# Patient Record
Sex: FEMALE | Race: WHITE
[De-identification: names, ages, dates, MRNs, and addresses within clinical notes are randomized per-mention and may not be internally consistent; named-entity substitution may affect disease eponyms.]

---

## 2021-09-05 ENCOUNTER — TRANSCRIPTION ENCOUNTER (OUTPATIENT)
Age: 36
End: 2021-09-05

## 2021-10-13 PROBLEM — Z00.00 ENCOUNTER FOR PREVENTIVE HEALTH EXAMINATION: Status: ACTIVE | Noted: 2021-10-13

## 2021-10-25 ENCOUNTER — NON-APPOINTMENT (OUTPATIENT)
Age: 36
End: 2021-10-25

## 2021-10-25 ENCOUNTER — APPOINTMENT (OUTPATIENT)
Dept: BREAST CENTER | Facility: CLINIC | Age: 36
End: 2021-10-25
Payer: COMMERCIAL

## 2021-10-25 VITALS
HEIGHT: 69 IN | SYSTOLIC BLOOD PRESSURE: 123 MMHG | BODY MASS INDEX: 31.84 KG/M2 | DIASTOLIC BLOOD PRESSURE: 87 MMHG | OXYGEN SATURATION: 98 % | WEIGHT: 215 LBS | HEART RATE: 85 BPM

## 2021-10-25 DIAGNOSIS — G43.D0 ABDOMINAL MIGRAINE, NOT INTRACTABLE: ICD-10-CM

## 2021-10-25 DIAGNOSIS — Z87.891 PERSONAL HISTORY OF NICOTINE DEPENDENCE: ICD-10-CM

## 2021-10-25 DIAGNOSIS — Z80.3 FAMILY HISTORY OF MALIGNANT NEOPLASM OF BREAST: ICD-10-CM

## 2021-10-25 DIAGNOSIS — R92.8 OTHER ABNORMAL AND INCONCLUSIVE FINDINGS ON DIAGNOSTIC IMAGING OF BREAST: ICD-10-CM

## 2021-10-25 DIAGNOSIS — Z80.8 FAMILY HISTORY OF MALIGNANT NEOPLASM OF OTHER ORGANS OR SYSTEMS: ICD-10-CM

## 2021-10-25 DIAGNOSIS — Z78.9 OTHER SPECIFIED HEALTH STATUS: ICD-10-CM

## 2021-10-25 PROCEDURE — 99204 OFFICE O/P NEW MOD 45 MIN: CPT

## 2021-10-25 RX ORDER — PROCHLORPERAZINE MALEATE 5 MG/1
TABLET ORAL
Refills: 0 | Status: ACTIVE | COMMUNITY

## 2021-10-25 NOTE — ASSESSMENT
[FreeTextEntry1] : The patient is a 36-year-old nulliparous premenopausal white female with [place] Ashkenazi Bahai descent.  She underwent menarche at age 12.  She is currently undergoing fertility treatment for IUI.  She has a strong family history with her mother, Mrs. Winston, who is a patient of mine who had breast cancer at age 77 and underwent a partial mastectomy as well as her sister with breast cancer at age 52.  The patient was sent for early screening mammography and ultrasound by her gynecologist due to her strong family history which was performed on April 30, 2021 showing a 4 x 2 mm density in the right breast central region and she underwent compression mammography and ultrasound on May 6, 2021 showing a 1 x 0.3 x 0.6 cm cystic density in the right breast 6:00 region 4 cm from the nipple.  Follow-up diagnostic right breast mammography ultrasound was recommended in 6 months.  On exam today I cannot feel any suspicious densities in either breast.  The patient is going [place] [facility] [place] them.  Otherwise, I agree with recommendation for her to get a diagnostic right breast mammography and ultrasound in November.  If she is pregnant, she will have to just have the ultrasound.  If those are unchanged and negative, I would like to see her again in 6 months around April 2022.  Her next bilateral mammography and ultrasound should be due at that time if she is not pregnant.

## 2021-10-25 NOTE — REASON FOR VISIT
[Initial Evaluation] : an initial evaluation [FreeTextEntry1] : The patient comes in with a strong family history of breast cancer and fibrocystic mastopathy.

## 2021-10-25 NOTE — HISTORY OF PRESENT ILLNESS
[FreeTextEntry1] : The patient is a 36-year-old nulliparous premenopausal white female with [place] citizen Ashkenazi Tenriism descent.  She underwent menarche at age 12.  She is currently undergoing fertility treatment for IUI.  She has a strong family history with her mother, Mrs. Winston, who is a patient of mine who had breast cancer at age 77 and underwent a partial mastectomy as well as her sister with breast cancer at age 52.  The patient was sent for early screening mammography and ultrasound by her gynecologist due to her strong family history which was performed on April 30, 2021 showing a 4 x 2 mm density in the right breast central region and she underwent compression mammography and ultrasound on May 6, 2021 showing a 1 x 0.3 x 0.6 cm cystic density in the right breast 6:00 region 4 cm from the nipple.  Follow-up diagnostic right breast mammography ultrasound was recommended in 6 months and she comes in now for a surgical evaluation.

## 2021-10-25 NOTE — PAST MEDICAL HISTORY
[Menarche Age ____] : age at menarche was [unfilled] [Definite ___ (Date)] : the last menstrual period was [unfilled] [Total Preg ___] : G[unfilled] [Live Births ___] : P[unfilled]  [History of Hormone Replacement Treatment] : has no history of hormone replacement treatment

## 2021-10-27 ENCOUNTER — TRANSCRIPTION ENCOUNTER (OUTPATIENT)
Age: 36
End: 2021-10-27

## 2022-05-10 DIAGNOSIS — N63.10 UNSPECIFIED LUMP IN THE RIGHT BREAST, UNSPECIFIED QUADRANT: ICD-10-CM

## 2022-06-02 DIAGNOSIS — Z12.31 ENCOUNTER FOR SCREENING MAMMOGRAM FOR MALIGNANT NEOPLASM OF BREAST: ICD-10-CM

## 2023-05-10 ENCOUNTER — NON-APPOINTMENT (OUTPATIENT)
Age: 38
End: 2023-05-10

## 2023-08-22 ENCOUNTER — APPOINTMENT (OUTPATIENT)
Dept: BREAST CENTER | Facility: CLINIC | Age: 38
End: 2023-08-22
Payer: COMMERCIAL

## 2023-08-22 VITALS
HEIGHT: 69 IN | BODY MASS INDEX: 33.33 KG/M2 | WEIGHT: 225 LBS | SYSTOLIC BLOOD PRESSURE: 122 MMHG | OXYGEN SATURATION: 98 % | HEART RATE: 82 BPM | DIASTOLIC BLOOD PRESSURE: 76 MMHG

## 2023-08-22 DIAGNOSIS — N60.11 DIFFUSE CYSTIC MASTOPATHY OF RIGHT BREAST: ICD-10-CM

## 2023-08-22 DIAGNOSIS — R92.2 INCONCLUSIVE MAMMOGRAM: ICD-10-CM

## 2023-08-22 DIAGNOSIS — N60.12 DIFFUSE CYSTIC MASTOPATHY OF LEFT BREAST: ICD-10-CM

## 2023-08-22 DIAGNOSIS — Z80.3 FAMILY HISTORY OF MALIGNANT NEOPLASM OF BREAST: ICD-10-CM

## 2023-08-22 PROCEDURE — 99213 OFFICE O/P EST LOW 20 MIN: CPT

## 2023-08-22 NOTE — PAST MEDICAL HISTORY
[Menarche Age ____] : age at menarche was [unfilled] [Definite ___ (Date)] : the last menstrual period was [unfilled] [History of Hormone Replacement Treatment] : has no history of hormone replacement treatment [Total Preg ___] : G[unfilled] [Live Births ___] : P[unfilled]  [Age At Live Birth ___] : Age at live birth: [unfilled]

## 2023-08-22 NOTE — REASON FOR VISIT
[Follow-Up: _____] : a [unfilled] follow-up visit [FreeTextEntry1] : The patient comes in with a strong family history of breast cancer and fibrocystic mastopathy.

## 2023-08-22 NOTE — PHYSICAL EXAM
[Normocephalic] : normocephalic [Atraumatic] : atraumatic [EOMI] : extra ocular movement intact [Supple] : supple [No Supraclavicular Adenopathy] : no supraclavicular adenopathy [No Cervical Adenopathy] : no cervical adenopathy [Normal Sinus Rhythm] : normal sinus rhythm [No dominant masses] : no dominant masses in right breast  [No dominant masses] : no dominant masses left breast [No Nipple Retraction] : no left nipple retraction [No Nipple Discharge] : no left nipple discharge [Breast Mass Right Breast ___cm] : no masses [Breast Mass Left Breast ___cm] : no masses [No Axillary Lymphadenopathy] : no left axillary lymphadenopathy [No Edema] : no edema [No Rashes] : no rashes [No Ulceration] : no ulceration [Breast Nipple Inversion] : nipples not inverted [Breast Nipple Retraction] : nipples not retracted [Breast Nipple Flattening] : nipples not flattened [Breast Nipple Fissures] : nipples not fissured [Breast Abnormal Lactation (Galactorrhea)] : no galactorrhea [Breast Abnormal Secretion Bloody Fluid] : no bloody discharge [Breast Abnormal Secretion Serous Fluid] : no serous discharge [Breast Abnormal Secretion Opalescent Fluid] : no milky discharge [de-identified] : On exam, the patient has D-cup breasts.  She has obvious bilateral reduction mastopexy incisions.  She is currently lactating and has diffusely thickened breast but no suspicious masses.  She has no axillary, supraclavicular, or cervical adenopathy. [de-identified] : Status post reduction mastopexy and currently lactating with no suspicious masses. [de-identified] : Status post reduction mastopexy and currently lactating with no suspicious masses.

## 2023-08-22 NOTE — ASSESSMENT
[FreeTextEntry1] : The patient is a 38-year-old G1, P1 premenopausal white female with Swedish Ashkenazi Moravian descent.  She underwent menarche at age 12.  She has undergone fertility treatment for IUI.  She has a strong family history with her mother, Mrs. Winston, who is a patient of mine who had breast cancer at age 77 and underwent a partial mastectomy as well as her sister with breast cancer at age 52.  The patient has a history of undergoing bilateral reduction mastopexy surgery in the past.  The patient was sent for early screening mammography and ultrasound by her gynecologist due to her strong family history which was performed on April 30, 2021 showing a 4 x 2 mm density in the right breast central region and she underwent compression mammography and ultrasound on May 6, 2021 showing a 1 x 0.3 x 0.6 cm cystic density in the right breast 6:00 region 4 cm from the nipple.  Follow-up diagnostic right breast mammography ultrasound was performed at [facility] on November 1, 2021 showing benign findings in the right breast lower inner quadrant as well as 3:00 region which were unchanged.  On exam, she is currently pumping and has a 6-month-old and has lactating breast but I cannot feel any suspicious masses.  She underwent her last bilateral mammography which was reviewed from June 2, 2022 performed at [facility] again showing these well-circumscribed masses in the lower aspect of the right breast and 3:00 region which were unchanged.  Since she is breast-feeding I would like her just to get a diagnostic bilateral ultrasound now and she was given a prescription and I will delay her next mammography until after she stops breast-feeding which may be in another 6 months.  She can follow-up again in another year.

## 2023-08-22 NOTE — HISTORY OF PRESENT ILLNESS
[FreeTextEntry1] : The patient is a 38-year-old G1, P1 premenopausal white female with [place] Ashkenazi Temple descent.  She underwent menarche at age 12.  She has undergone fertility treatment for IUI.  She has a strong family history with her mother, Mrs. Winston, who is a patient of mine who had breast cancer at age 77 and underwent a partial mastectomy as well as her sister with breast cancer at age 52.  The patient has a history of undergoing bilateral reduction mastopexy surgery.  The patient was sent for early screening mammography and ultrasound by her gynecologist due to her strong family history which was performed on April 30, 2021 showing a 4 x 2 mm density in the right breast central region and she underwent compression mammography and ultrasound on May 6, 2021 showing a 1 x 0.3 x 0.6 cm cystic density in the right breast 6:00 region 4 cm from the nipple.  Follow-up diagnostic right breast mammography ultrasound was performed in November 2021 again showing likely benign findings in the right breast 6:00 and 3:00 region.  She comes in now for routine follow-up and she is currently lactating and has a 6-month-old.

## 2023-09-14 ENCOUNTER — RESULT REVIEW (OUTPATIENT)
Age: 38
End: 2023-09-14

## 2024-08-05 ENCOUNTER — NON-APPOINTMENT (OUTPATIENT)
Age: 39
End: 2024-08-05

## 2024-08-05 NOTE — PAST MEDICAL HISTORY
[Menarche Age ____] : age at menarche was [unfilled] [History of Hormone Replacement Treatment] : has no history of hormone replacement treatment [Definite ___ (Date)] : the last menstrual period was [unfilled] [Total Preg ___] : G[unfilled] [Live Births ___] : P[unfilled]  [Age At Live Birth ___] : Age at live birth: [unfilled]

## 2024-08-05 NOTE — ASSESSMENT
[FreeTextEntry1] : The patient is a 39-year-old G1, P1 premenopausal white female with [place] Ashkenazi Lutheran descent.  She underwent menarche at age 12.  She has undergone fertility treatment for IUI.  She has a strong family history with her mother, Mrs. Winston, who is a patient of mine who had breast cancer at age 77 and underwent a partial mastectomy as well as her sister with breast cancer at age 52.  The patient has a history of undergoing bilateral reduction mastopexy surgery in the past.  The patient was sent for early screening mammography and ultrasound by her gynecologist due to her strong family history which was performed on April 30, 2021 showing a 4 x 2 mm density in the right breast central region and she underwent compression mammography and ultrasound on May 6, 2021 showing a 1 x 0.3 x 0.6 cm cystic density in the right breast 6:00 region 4 cm from the nipple.  Follow-up diagnostic right breast mammography ultrasound was performed at [facility] on November 1, 2021 showing benign findings in the right breast lower inner quadrant as well as 3:00 region which were unchanged.  On exam, she is currently pumping ??????? and has a 6-month-old and has lactating breast tissue and I cannot feel any suspicious masses.  She underwent her last bilateral mammography on ????????? June 2, 2022 performed at [facility] again showing these well-circumscribed masses in the lower aspect of the right breast and 3:00 region which were unchanged.  She underwent a bilateral breast ultrasound which was reviewed from [facility] performed on September 15, 2023 which showed no suspicious findings in either breast.  Since she is breast-feeding ????????, I will delay her next mammography until after she stops breast-feeding which may be in another 6 months.  She can follow-up again in another year.

## 2024-08-05 NOTE — PHYSICAL EXAM
[Normocephalic] : normocephalic [Atraumatic] : atraumatic [EOMI] : extra ocular movement intact [Supple] : supple [No Supraclavicular Adenopathy] : no supraclavicular adenopathy [No Cervical Adenopathy] : no cervical adenopathy [Normal Sinus Rhythm] : normal sinus rhythm [No dominant masses] : no dominant masses in right breast  [No dominant masses] : no dominant masses left breast [No Nipple Retraction] : no left nipple retraction [No Nipple Discharge] : no left nipple discharge [Breast Mass Right Breast ___cm] : no masses [Breast Mass Left Breast ___cm] : no masses [Breast Nipple Inversion] : nipples not inverted [Breast Nipple Retraction] : nipples not retracted [Breast Nipple Flattening] : nipples not flattened [Breast Nipple Fissures] : nipples not fissured [Breast Abnormal Lactation (Galactorrhea)] : no galactorrhea [Breast Abnormal Secretion Bloody Fluid] : no bloody discharge [Breast Abnormal Secretion Serous Fluid] : no serous discharge [Breast Abnormal Secretion Opalescent Fluid] : no milky discharge [No Axillary Lymphadenopathy] : no left axillary lymphadenopathy [No Edema] : no edema [No Rashes] : no rashes [No Ulceration] : no ulceration [de-identified] : On exam, the patient has D-cup breasts.  She has obvious bilateral reduction mastopexy incisions.  She is currently lactating ????? and has diffusely thickened breast but no suspicious masses.  She has no axillary, supraclavicular, or cervical adenopathy. [de-identified] : Status post reduction mastopexy and currently lactating ?????? with no suspicious masses. [de-identified] : Status post reduction mastopexy and currently lactating ???????? with no suspicious masses.

## 2024-08-05 NOTE — HISTORY OF PRESENT ILLNESS
[FreeTextEntry1] : The patient is a 39-year-old G1, P1 premenopausal white female with [place] Ashkenazi Gnosticist descent.  She underwent menarche at age 12.  She has undergone fertility treatment for IUI.  She has a strong family history with her mother, Mrs. Winston, who is a patient of mine who had breast cancer at age 77 and underwent a partial mastectomy as well as her sister with breast cancer at age 52.  The patient has a history of undergoing bilateral reduction mastopexy surgery.  The patient was sent for early screening mammography and ultrasound by her gynecologist due to her strong family history which was performed on April 30, 2021 showing a 4 x 2 mm density in the right breast central region and she underwent compression mammography and ultrasound on May 6, 2021 showing a 1 x 0.3 x 0.6 cm cystic density in the right breast 6:00 region 4 cm from the nipple.  Follow-up diagnostic right breast mammography ultrasound was performed in November 2021 again showing likely benign findings in the right breast 6:00 and 3:00 region.  She comes in now for routine follow-up and she is currently lactating ?????? and has a 6-month-old.

## 2024-08-26 ENCOUNTER — APPOINTMENT (OUTPATIENT)
Dept: BREAST CENTER | Facility: CLINIC | Age: 39
End: 2024-08-26
Payer: COMMERCIAL

## 2024-08-26 VITALS
WEIGHT: 230 LBS | DIASTOLIC BLOOD PRESSURE: 78 MMHG | OXYGEN SATURATION: 100 % | HEIGHT: 69 IN | SYSTOLIC BLOOD PRESSURE: 116 MMHG | BODY MASS INDEX: 34.07 KG/M2 | HEART RATE: 96 BPM

## 2024-08-26 DIAGNOSIS — Z80.3 FAMILY HISTORY OF MALIGNANT NEOPLASM OF BREAST: ICD-10-CM

## 2024-08-26 DIAGNOSIS — Z12.31 ENCOUNTER FOR SCREENING MAMMOGRAM FOR MALIGNANT NEOPLASM OF BREAST: ICD-10-CM

## 2024-08-26 DIAGNOSIS — N60.12 DIFFUSE CYSTIC MASTOPATHY OF LEFT BREAST: ICD-10-CM

## 2024-08-26 DIAGNOSIS — R92.30 DENSE BREASTS, UNSPECIFIED: ICD-10-CM

## 2024-08-26 PROCEDURE — 99213 OFFICE O/P EST LOW 20 MIN: CPT

## 2024-08-26 NOTE — PAST MEDICAL HISTORY
[Menarche Age ____] : age at menarche was [unfilled] [Definite ___ (Date)] : the last menstrual period was [unfilled] [Total Preg ___] : G[unfilled] [Live Births ___] : P[unfilled]  [Age At Live Birth ___] : Age at live birth: [unfilled] [History of Hormone Replacement Treatment] : has no history of hormone replacement treatment

## 2024-08-26 NOTE — HISTORY OF PRESENT ILLNESS
[FreeTextEntry1] : The patient is a 39-year-old G1, P1 premenopausal white female with Malagasy Ashkenazi Muslim descent.  She underwent menarche at age 12.  She has undergone fertility treatment for IUI.  She has a strong family history with her mother, Mrs. Winston, who is a patient of mine who had breast cancer at age 77 and underwent a partial mastectomy as well as her sister with breast cancer at age 52.  The patient has a history of undergoing bilateral reduction mastopexy surgery.  The patient was sent for early screening mammography and ultrasound by her gynecologist due to her strong family history which was performed on April 30, 2021 showing a 4 x 2 mm density in the right breast central region and she underwent compression mammography and ultrasound on May 6, 2021 showing a 1 x 0.3 x 0.6 cm cystic density in the right breast 6:00 region 4 cm from the nipple.  Follow-up diagnostic right breast mammography ultrasound was performed in November 2021 again showing likely benign findings in the right breast 6:00 and 3:00 region.  She comes in now for routine follow-up and she stopped breast-feeding in January 2024.

## 2024-08-26 NOTE — ADDENDUM
[FreeTextEntry1] : I spent greater than 75% in consultation in face-to-face counseling and coordination of care in this patient and increased risk of breast cancer who comes in for routine breast cancer screening and surveillance.

## 2024-08-26 NOTE — ASSESSMENT
[FreeTextEntry1] : The patient is a 39-year-old G1, P1 premenopausal white female with Bhutanese Ashkenazi Mormon descent.  She underwent menarche at age 12.  She has undergone fertility treatment for IUI.  She has a strong family history with her mother, Mrs. Winston, who is a patient of mine who had breast cancer at age 77 and underwent a partial mastectomy as well as her sister with breast cancer at age 52.  The patient has a history of undergoing bilateral reduction mastopexy surgery in the past.  The patient was sent for early screening mammography and ultrasound by her gynecologist due to her strong family history which was performed on April 30, 2021 showing a 4 x 2 mm density in the right breast central region and she underwent compression mammography and ultrasound on May 6, 2021 showing a 1 x 0.3 x 0.6 cm cystic density in the right breast 6:00 region 4 cm from the nipple.  Follow-up diagnostic right breast mammography ultrasound was performed at [facility] on November 1, 2021 showing benign findings in the right breast lower inner quadrant as well as 3:00 region which were unchanged.  On exam, she stopped breast-feeding in January 2024 and I cannot feel any suspicious masses in either breast.  She has obvious reduction mastopexy incisions.  She underwent her last bilateral mammography on June 2, 2022 performed at [facility] again showing these well-circumscribed masses in the lower aspect of the right breast and 3:00 region which were unchanged.  She underwent a bilateral breast ultrasound which was reviewed from [place] performed on September 15, 2023 which showed no suspicious findings in either breast.  I would like her to restart breast imaging and she was given a prescription to schedule mammography and ultrasound as soon as possible and I will follow-up on the reports.  If that is unchanged and negative, she can follow-up again in another year and continue with yearly mammography and ultrasound.

## 2024-08-26 NOTE — HISTORY OF PRESENT ILLNESS
[FreeTextEntry1] : The patient is a 39-year-old G1, P1 premenopausal white female with [place] Ashkenazi Worship descent.  She underwent menarche at age 12.  She has undergone fertility treatment for IUI.  She has a strong family history with her mother, Mrs. Winston, who is a patient of mine who had breast cancer at age 77 and underwent a partial mastectomy as well as her sister with breast cancer at age 52.  The patient has a history of undergoing bilateral reduction mastopexy surgery.  The patient was sent for early screening mammography and ultrasound by her gynecologist due to her strong family history which was performed on April 30, 2021 showing a 4 x 2 mm density in the right breast central region and she underwent compression mammography and ultrasound on May 6, 2021 showing a 1 x 0.3 x 0.6 cm cystic density in the right breast 6:00 region 4 cm from the nipple.  Follow-up diagnostic right breast mammography ultrasound was performed in November 2021 again showing likely benign findings in the right breast 6:00 and 3:00 region.  She comes in now for routine follow-up and she stopped breast-feeding in January 2024.

## 2024-08-26 NOTE — ASSESSMENT
[FreeTextEntry1] : The patient is a 39-year-old G1, P1 premenopausal white female with [place] Ashkenazi Jainism descent.  She underwent menarche at age 12.  She has undergone fertility treatment for IUI.  She has a strong family history with her mother, Mrs. Winston, who is a patient of mine who had breast cancer at age 77 and underwent a partial mastectomy as well as her sister with breast cancer at age 52.  The patient has a history of undergoing bilateral reduction mastopexy surgery in the past.  The patient was sent for early screening mammography and ultrasound by her gynecologist due to her strong family history which was performed on April 30, 2021 showing a 4 x 2 mm density in the right breast central region and she underwent compression mammography and ultrasound on May 6, 2021 showing a 1 x 0.3 x 0.6 cm cystic density in the right breast 6:00 region 4 cm from the nipple.  Follow-up diagnostic right breast mammography ultrasound was performed at [facility] on November 1, 2021 showing benign findings in the right breast lower inner quadrant as well as 3:00 region which were unchanged.  On exam, she stopped breast-feeding in January 2024 and I cannot feel any suspicious masses in either breast.  She has obvious reduction mastopexy incisions.  She underwent her last bilateral mammography on June 2, 2022 performed at [facility] again showing these well-circumscribed masses in the lower aspect of the right breast and 3:00 region which were unchanged.  She underwent a bilateral breast ultrasound which was reviewed from [facility] performed on September 15, 2023 which showed no suspicious findings in either breast.  I would like her to restart breast imaging and she was given a prescription to schedule mammography and ultrasound as soon as possible and I will follow-up on the reports.  If that is unchanged and negative, she can follow-up again in another year and continue with yearly mammography and ultrasound.

## 2024-08-26 NOTE — PHYSICAL EXAM
[Normocephalic] : normocephalic [Atraumatic] : atraumatic [EOMI] : extra ocular movement intact [Supple] : supple [No Supraclavicular Adenopathy] : no supraclavicular adenopathy [No Cervical Adenopathy] : no cervical adenopathy [Normal Sinus Rhythm] : normal sinus rhythm [No dominant masses] : no dominant masses in right breast  [No dominant masses] : no dominant masses left breast [No Nipple Retraction] : no left nipple retraction [No Nipple Discharge] : no left nipple discharge [Breast Mass Right Breast ___cm] : no masses [Breast Mass Left Breast ___cm] : no masses [No Axillary Lymphadenopathy] : no left axillary lymphadenopathy [No Edema] : no edema [No Rashes] : no rashes [No Ulceration] : no ulceration [Breast Nipple Inversion] : nipples not inverted [Breast Nipple Retraction] : nipples not retracted [Breast Nipple Flattening] : nipples not flattened [Breast Nipple Fissures] : nipples not fissured [Breast Abnormal Lactation (Galactorrhea)] : no galactorrhea [Breast Abnormal Secretion Bloody Fluid] : no bloody discharge [Breast Abnormal Secretion Serous Fluid] : no serous discharge [Breast Abnormal Secretion Opalescent Fluid] : no milky discharge [de-identified] : On exam, the patient has D-cup breasts.  She has obvious bilateral reduction mastopexy incisions.  She has diffusely thickened breasts but no suspicious masses.  She has no axillary, supraclavicular, or cervical adenopathy. [de-identified] : Status post reduction mastopexy with no suspicious masses. [de-identified] : Status post reduction mastopexy with no suspicious masses.

## 2024-08-26 NOTE — PHYSICAL EXAM
[Normocephalic] : normocephalic [Atraumatic] : atraumatic [EOMI] : extra ocular movement intact [Supple] : supple [No Supraclavicular Adenopathy] : no supraclavicular adenopathy [No Cervical Adenopathy] : no cervical adenopathy [Normal Sinus Rhythm] : normal sinus rhythm [No dominant masses] : no dominant masses in right breast  [No dominant masses] : no dominant masses left breast [No Nipple Retraction] : no left nipple retraction [No Nipple Discharge] : no left nipple discharge [Breast Mass Right Breast ___cm] : no masses [Breast Mass Left Breast ___cm] : no masses [No Axillary Lymphadenopathy] : no left axillary lymphadenopathy [No Edema] : no edema [No Rashes] : no rashes [No Ulceration] : no ulceration [Breast Nipple Inversion] : nipples not inverted [Breast Nipple Retraction] : nipples not retracted [Breast Nipple Flattening] : nipples not flattened [Breast Nipple Fissures] : nipples not fissured [Breast Abnormal Lactation (Galactorrhea)] : no galactorrhea [Breast Abnormal Secretion Bloody Fluid] : no bloody discharge [Breast Abnormal Secretion Serous Fluid] : no serous discharge [Breast Abnormal Secretion Opalescent Fluid] : no milky discharge [de-identified] : On exam, the patient has D-cup breasts.  She has obvious bilateral reduction mastopexy incisions.  She has diffusely thickened breasts but no suspicious masses.  She has no axillary, supraclavicular, or cervical adenopathy. [de-identified] : Status post reduction mastopexy with no suspicious masses. [de-identified] : Status post reduction mastopexy with no suspicious masses.

## 2024-09-19 ENCOUNTER — RESULT REVIEW (OUTPATIENT)
Age: 39
End: 2024-09-19